# Patient Record
Sex: FEMALE | Race: OTHER | HISPANIC OR LATINO | URBAN - METROPOLITAN AREA
[De-identification: names, ages, dates, MRNs, and addresses within clinical notes are randomized per-mention and may not be internally consistent; named-entity substitution may affect disease eponyms.]

---

## 2024-11-19 ENCOUNTER — EMERGENCY (EMERGENCY)
Facility: HOSPITAL | Age: 59
LOS: 1 days | Discharge: HOME | End: 2024-11-19
Attending: STUDENT IN AN ORGANIZED HEALTH CARE EDUCATION/TRAINING PROGRAM | Admitting: STUDENT IN AN ORGANIZED HEALTH CARE EDUCATION/TRAINING PROGRAM
Payer: COMMERCIAL

## 2024-11-19 VITALS
DIASTOLIC BLOOD PRESSURE: 75 MMHG | WEIGHT: 160.06 LBS | HEART RATE: 106 BPM | TEMPERATURE: 98 F | SYSTOLIC BLOOD PRESSURE: 158 MMHG | OXYGEN SATURATION: 100 % | RESPIRATION RATE: 20 BRPM | HEIGHT: 64 IN

## 2024-11-19 VITALS
DIASTOLIC BLOOD PRESSURE: 75 MMHG | RESPIRATION RATE: 18 BRPM | SYSTOLIC BLOOD PRESSURE: 119 MMHG | HEART RATE: 79 BPM | TEMPERATURE: 98 F | OXYGEN SATURATION: 98 %

## 2024-11-19 PROCEDURE — 96375 TX/PRO/DX INJ NEW DRUG ADDON: CPT

## 2024-11-19 PROCEDURE — 99284 EMERGENCY DEPT VISIT MOD MDM: CPT

## 2024-11-19 PROCEDURE — 99284 EMERGENCY DEPT VISIT MOD MDM: CPT | Mod: 25

## 2024-11-19 PROCEDURE — 96374 THER/PROPH/DIAG INJ IV PUSH: CPT

## 2024-11-19 RX ORDER — EPINEPHRINE 1 MG/ML
0.3 INJECTION INTRAMUSCULAR; INTRAVENOUS; SUBCUTANEOUS
Qty: 1 | Refills: 1
Start: 2024-11-19 | End: 2024-11-20

## 2024-11-19 RX ORDER — SODIUM CHLORIDE 9 MG/ML
1000 INJECTION, SOLUTION INTRAMUSCULAR; INTRAVENOUS; SUBCUTANEOUS ONCE
Refills: 0 | Status: COMPLETED | OUTPATIENT
Start: 2024-11-19 | End: 2024-11-19

## 2024-11-19 RX ORDER — DIPHENHYDRAMINE HCL 12.5MG/5ML
25 ELIXIR ORAL ONCE
Refills: 0 | Status: COMPLETED | OUTPATIENT
Start: 2024-11-19 | End: 2024-11-19

## 2024-11-19 RX ORDER — FAMOTIDINE 10 MG/ML
20 INJECTION INTRAVENOUS ONCE
Refills: 0 | Status: COMPLETED | OUTPATIENT
Start: 2024-11-19 | End: 2024-11-19

## 2024-11-19 RX ADMIN — FAMOTIDINE 20 MILLIGRAM(S): 10 INJECTION INTRAVENOUS at 18:52

## 2024-11-19 RX ADMIN — Medication 25 MILLIGRAM(S): at 19:58

## 2024-11-19 RX ADMIN — SODIUM CHLORIDE 1000 MILLILITER(S): 9 INJECTION, SOLUTION INTRAMUSCULAR; INTRAVENOUS; SUBCUTANEOUS at 18:52

## 2024-11-19 NOTE — ED PROVIDER NOTE - PROGRESS NOTE DETAILS

## 2024-11-19 NOTE — ED PROVIDER NOTE - PHYSICAL EXAMINATION
Gen - NAD; well-appearing, speaking full sentences comfortably in normal voice; A+Ox3   HEENT - NCAT, EOMI, moist mucous membranes, clear oropharynx, midline uvula, no tongue elevation, no pooling of secretions, no edema  Neck - supple, FROM, no edema  Resp - CTAB, no increased WOB, no tachypnea, no stridor  CV -  RRR, no m/r/g  Abd - soft, NT, ND; no guarding or rebound  MSK - FROM of b/l UE and LE, no gross deformities  Extrem - no LE edema/erythema/tenderness  Neuro - no focal motor or sensation deficits  Skin - warm, well perfused; no appreciable rash or urticaria

## 2024-11-19 NOTE — ED ADULT NURSE NOTE - OBJECTIVE STATEMENT
BIBEMS from  for c/o allergic reaction, ate barley soup at 5pm, known peanut allergy without hx epi, givem meds as per triage note at 545pm. Presents with c/o mild throat irritation and feeling very jittery and anxious.    Pt upgraded from triage with immediate ED RN and MD team at bedside. Placed on CM, EKG obtained, PIV established with labs sent. Medicated per verbal orders as reflected in MAR. Pt remains on CM, resting on stretcher, stable. Family at bedside.     Now notes improvement to all symptoms.

## 2024-11-19 NOTE — ED ADULT TRIAGE NOTE - CHIEF COMPLAINT QUOTE
IGNACIA was eating barley soup at home at 17:00. 15 minutes later, hives to the chest onset with throat tightness and "hot" sensation. She went to [facility] who gave 0.3mg IM epi, 10mg IM dexamethasone, 50mg IM benadryl att at 17:45 then brought pt here. Pt reports improvement

## 2024-11-19 NOTE — ED PROVIDER NOTE - CLINICAL SUMMARY MEDICAL DECISION MAKING FREE TEXT BOX
58 year old female with known allergy to PCN/peanuts, no prior anaphylaxis/epi dose, presenting with allergic reaction s/p epi dose @ 5:45, now resolved. 58 year old female with known allergy to PCN/peanuts, no prior anaphylaxis/epi dose, presenting with allergic reaction s/p epi dose @ 5:45, now resolved. Very well appearing, protecting airway and tolerating secretions with ease, speaking full sentences in normal voice, clear oropharynx/lungs, no appreciable swelling. No signs of angioedema, not meeting anaphylaxis criteria at this point. Will complete cocktail with IV pepcid and give IV hydration. Will obs for approx 4 hr from epi dose (9:30 pm est). If stable through this time, will DC with epi pen and steroid rx to pharmacy. Patient to f/u with allergy specialist.

## 2024-11-19 NOTE — ED PROVIDER NOTE - PATIENT PORTAL LINK FT
You can access the FollowMyHealth Patient Portal offered by [facility] by registering at the following website: [email or web address]. By joining [email or web address]’s FollowMyHealth portal, you will also be able to view your health information using other applications (apps) compatible with our system.

## 2024-11-19 NOTE — ED PROVIDER NOTE - OBJECTIVE STATEMENT
58 year old female with known allergy to PCN/peanuts, no prior anaphylaxis/epi dose, presenting with allergic reaction. States she ate barley soup around 5pm and had onset of hives over chest and throat itching. Received IM epi, decadron, and benadryl at urgent care @ 5:45 pm, now feels better. Denies SOB, facial/oral/neck swelling, CP, n/v/d, ab pain.

## 2024-11-19 NOTE — ED PROVIDER NOTE - NSFOLLOWUPINSTRUCTIONS_ED_ALL_ED_FT
You were seen in the Emergency Department for: allergic reaction    You were prescribed to the pharmacy: epi-pen, prednisone  Please follow the instructions on the container/label and ask your pharmacist for any questions/concerns.    Please follow up with your primary physician and an allergy specialist. If you do not have a primary physician or specialist of your needs, please call [phone] to find one convenient for you. At this number you will be able to locate a provider who accepts your insurance, as well as locate the right specialist for your needs.    You should return to the Emergency Department if you feel any new/worsening/persistent symptoms including but not limited to: fever, chills, vomiting, chest pain, difficulty breathing, loss of consciousness, bleeding, uncontrolled pain, numbness/weakness of a body part

## 2024-11-20 RX ORDER — EPINEPHRINE 1 MG/ML
0.3 INJECTION INTRAMUSCULAR; INTRAVENOUS; SUBCUTANEOUS
Qty: 1 | Refills: 0
Start: 2024-11-20

## 2024-11-20 RX ORDER — EPINEPHRINE 1 MG/ML
0.3 INJECTION INTRAMUSCULAR; INTRAVENOUS; SUBCUTANEOUS
Qty: 1 | Refills: 0
Start: 2024-11-20 | End: 2024-11-20
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
## 2024-11-21 DIAGNOSIS — T78.49XA OTHER ALLERGY, INITIAL ENCOUNTER: ICD-10-CM

## 2024-11-21 DIAGNOSIS — Z88.0 ALLERGY STATUS TO PENICILLIN: ICD-10-CM

## 2024-11-21 DIAGNOSIS — Z91.010 ALLERGY TO PEANUTS: ICD-10-CM
